# Patient Record
Sex: MALE | Race: WHITE
[De-identification: names, ages, dates, MRNs, and addresses within clinical notes are randomized per-mention and may not be internally consistent; named-entity substitution may affect disease eponyms.]

---

## 2018-03-22 LAB
BASOPHILS # BLD AUTO: 0.1 10*3/UL (ref 0–0.1)
BASOPHILS NFR BLD AUTO: 0.8 % (ref 0–1)
DEPRECATED NEUTROPHILS # BLD AUTO: 8.2 10*3/UL (ref 2.1–6.9)
EOSINOPHIL # BLD AUTO: 0.2 10*3/UL (ref 0–0.4)
EOSINOPHIL NFR BLD AUTO: 2 % (ref 0–6)
ERYTHROCYTE [DISTWIDTH] IN CORD BLOOD: 13.3 % (ref 11.7–14.4)
HCT VFR BLD AUTO: 47 % (ref 38.2–49.6)
HGB BLD-MCNC: 15.4 G/DL (ref 14–18)
LYMPHOCYTES # BLD: 2.3 10*3/UL (ref 1–3.2)
LYMPHOCYTES NFR BLD AUTO: 19.5 % (ref 18–39.1)
MCH RBC QN AUTO: 27.1 PG (ref 28–32)
MCHC RBC AUTO-ENTMCNC: 32.8 G/DL (ref 31–35)
MCV RBC AUTO: 82.7 FL (ref 81–99)
MONOCYTES # BLD AUTO: 0.9 10*3/UL (ref 0.2–0.8)
MONOCYTES NFR BLD AUTO: 8 % (ref 4.4–11.3)
NEUTS SEG NFR BLD AUTO: 69.4 % (ref 38.7–80)
PLATELET # BLD AUTO: 302 X10E3/UL (ref 140–360)
RBC # BLD AUTO: 5.68 X10E6/UL (ref 4.3–5.7)

## 2018-03-26 ENCOUNTER — HOSPITAL ENCOUNTER (OUTPATIENT)
Dept: HOSPITAL 88 - OR | Age: 18
Discharge: HOME | End: 2018-03-26
Attending: SURGERY
Payer: COMMERCIAL

## 2018-03-26 DIAGNOSIS — L05.91: Primary | ICD-10-CM

## 2018-03-26 DIAGNOSIS — Z01.812: ICD-10-CM

## 2018-03-26 DIAGNOSIS — F17.210: ICD-10-CM

## 2018-03-26 PROCEDURE — 36415 COLL VENOUS BLD VENIPUNCTURE: CPT

## 2018-03-26 PROCEDURE — 85025 COMPLETE CBC W/AUTO DIFF WBC: CPT

## 2018-03-26 PROCEDURE — 11772 EXC PILONIDAL CYST COMP: CPT

## 2018-03-26 PROCEDURE — 88304 TISSUE EXAM BY PATHOLOGIST: CPT

## 2018-03-26 NOTE — OPERATIVE REPORT
DATE OF PROCEDURE:  March 26, 2018 



PREOPERATIVE DIAGNOSIS:  Recurrent pilonidal cyst with multiple complex 

sinuses.



POSTOPERATIVE DIAGNOSIS:  Recurrent pilonidal cyst with multiple complex 

sinuses.  



OPERATION PERFORMED:  Wide excision of complex recurrent pilonidal cyst and 

sinuses with rotational gluteal flap closure.  



ANESTHESIA:  General.



COMPLICATIONS:  None.



ESTIMATED BLOOD LOSS:  Minimal.



DESCRIPTION OF PROCEDURE:  With the patient lying in bed in the prone 

position under good general endotracheal anesthesia, the lower back and 

perineum were prepped with Betadine solution and draped in the usual 

manner.  The patient had multiple midline sinuses and a long tract that 

extended into the left buttock up high in the sacral area.  All of the 

openings of the sinuses were then included in an elliptical incision, which 

was then deepened through the subcutaneous tissue all the way down to the 

sacral fascia.  The entire cyst with all of the multiple sinuses with 

removed without violating any of the borders.  Hemostasis was then 

ascertained.  The gluteal fascia was then raised circumferentially in order 

to be able to reapproximate the large defect that was left, and mobilized 

all the way around on both sides.  After this was done, the fascia was then 

reapproximated at the midline using interrupted sutures of 2-0 Vicryl.  The 

whole area was thoroughly irrigated.  Perfect hemostasis was ascertained.  

All layers were infiltrated on the way out with a solution of 0.25% 

Marcaine.  Subcutaneous tissue was approximated with interrupted layers of 

2-0 Vicryl.  The skin was closed with interrupted vertical mattress sutures 

of 

2-0 and 3-0 silk.  A dressing was applied.  The sponge, lap and needle 

count was correct.  Patient tolerated the procedure well, and returned to 

the recovery room in stable condition.  



  









DD:  03/26/2018 12:33

DT:  03/26/2018 13:44

Job#:  [id number]